# Patient Record
Sex: FEMALE | Race: WHITE | NOT HISPANIC OR LATINO | Employment: FULL TIME | URBAN - METROPOLITAN AREA
[De-identification: names, ages, dates, MRNs, and addresses within clinical notes are randomized per-mention and may not be internally consistent; named-entity substitution may affect disease eponyms.]

---

## 2022-05-23 ENCOUNTER — TELEPHONE (OUTPATIENT)
Dept: PERINATAL CARE | Facility: CLINIC | Age: 32
End: 2022-05-23

## 2022-05-23 NOTE — TELEPHONE ENCOUNTER
Lvm regarding cancelling her detailed ultrasound appt for today at [facility] due to sonographer calling out sick

## 2022-05-24 ENCOUNTER — ROUTINE PRENATAL (OUTPATIENT)
Dept: PERINATAL CARE | Facility: OTHER | Age: 32
End: 2022-05-24
Payer: COMMERCIAL

## 2022-05-24 VITALS
DIASTOLIC BLOOD PRESSURE: 69 MMHG | BODY MASS INDEX: 17.59 KG/M2 | WEIGHT: 89.6 LBS | HEART RATE: 89 BPM | HEIGHT: 60 IN | SYSTOLIC BLOOD PRESSURE: 106 MMHG

## 2022-05-24 DIAGNOSIS — O09.899 SUPERVISION OF OTHER HIGH RISK PREGNANCIES, UNSPECIFIED TRIMESTER: ICD-10-CM

## 2022-05-24 DIAGNOSIS — Z3A.19 19 WEEKS GESTATION OF PREGNANCY: ICD-10-CM

## 2022-05-24 DIAGNOSIS — O44.02 PLACENTA PREVIA WITHOUT HEMORRHAGE IN SECOND TRIMESTER: ICD-10-CM

## 2022-05-24 DIAGNOSIS — Z36.3 ENCOUNTER FOR ANTENATAL SCREENING FOR MALFORMATION USING ULTRASOUND: Primary | ICD-10-CM

## 2022-05-24 PROCEDURE — 76817 TRANSVAGINAL US OBSTETRIC: CPT | Performed by: OBSTETRICS & GYNECOLOGY

## 2022-05-24 PROCEDURE — 99242 OFF/OP CONSLTJ NEW/EST SF 20: CPT | Performed by: OBSTETRICS & GYNECOLOGY

## 2022-05-24 PROCEDURE — 76805 OB US >/= 14 WKS SNGL FETUS: CPT | Performed by: OBSTETRICS & GYNECOLOGY

## 2022-05-24 NOTE — LETTER
June 7, 2022     Oscar Bernabe MD  [address]  [address]  [address] [place] [id number]    Patient: Cara Allen   YOB: 1990   Date of Visit: 5/24/2022       Dear Dr Mccabe Landing: Thank you for referring Cara Allen to me for evaluation  Below are my notes for this consultation  If you have questions, please do not hesitate to call me  I look forward to following your patient along with you  Sincerely,        Hal Govea MD        CC: No Recipients  Hal Govea MD  6/7/2022  8:31 AM  Sign when Signing Visit  CONSULT NOTE    Oscar Bernabe MD  [address]  [address]  [facility],  [address] [place] [address]     Thank you for referring your Cara Allen for a Maternal-Fetal Medicine Consultation:  Below is my consultation  Thank you very much for requesting a consultation this very nice patient for the indication of a fetal anatomic evaluation  This is the patient's 2nd pregnancy  First pregnancy resulted in termination of pregnancy  She has no medical or surgical history  She currently takes prenatal vitamins and she has no known drug allergies  Substance use history is unremarkable  Family history is significant for her father with hypertension  On exam today Fernando Preston appears well, in no acute distress, and denies any complaints  The fetal anatomic survey is complete  There is no sonographic evidence of fetal abnormalities at this time  Good fetal movement and tone are seen  The amniotic fluid volume appears normal    A transvaginal ultrasound was performed to assess the cervix, which was not seen well transabdominally  A placenta previa is identified  The cervical length was 3 53 centimeters, which is normal for the current gestational age  There was no significant funneling or dynamic changes appreciated  The patient was informed of today's findings and all of her questions were answered    The limitations of ultrasound were reviewed  We discussed that her low BMI increases the risk slightly for fetal growth restriction  I encouraged weight gain of 35-45 lb total throughout the pregnancy  If she is having trouble gaining weight, recommend supplementation with high caloric food and formal dietary counseling  We discussed today's findings of a placenta previa confirmed on transvaginal imaging  We discussed that this is not an uncommon 2nd trimester finding and that it may resolve by the 3rd trimester  It is a risk factor for vaginal bleeding if it does not resolve by the 3rd trimester and  delivery may be necessary if the placental edge is not greater than 1 cm away from the internal os by term  We discussed follow-up this condition and I recommend she return at 28 weeks to re-evaluate the placenta location  I recommend the patient avoid intercourse so as not to increase the risk for vaginal bleeding until re-evaluation at 28 weeks  We discussed follow-up in detail, and I recommend a growth evaluation at around 28 weeks gestation  Please note, in addition to the time spent discussing the results of the ultrasound, I spent approximately 15 minutes of face-to-face time with the patient, greater than 50% of which was spent in counseling and the coordination of care for this patient  Thank you very much for allowing us to participate in the care of this very nice patient  Should you have any questions, please do not hesitate to contact our office  Charlie Isaacs MD  Attending Physician, Michelle

## 2022-05-24 NOTE — PROGRESS NOTES
Ultrasound Probe Disinfection    A transvaginal ultrasound was performed  Prior to use, disinfection was performed with High Level Disinfection Process (Trophon)  Probe serial number [id number]: [id number] was used        Chanell Madsen RDMS  05/24/22  2:41 PM

## 2022-06-07 PROBLEM — O44.02 PLACENTA PREVIA WITHOUT HEMORRHAGE IN SECOND TRIMESTER: Status: ACTIVE | Noted: 2022-06-07

## 2022-06-07 NOTE — PROGRESS NOTES
CONSULT NOTE    Sai Zavala MD  [facility]  [address]  [place],  [address] [facility]     Thank you for referring your Maximo Monet for a Maternal-Fetal Medicine Consultation:  Below is my consultation  Thank you very much for requesting a consultation this very nice patient for the indication of a fetal anatomic evaluation  This is the patient's 2nd pregnancy  First pregnancy resulted in termination of pregnancy  She has no medical or surgical history  She currently takes prenatal vitamins and she has no known drug allergies  Substance use history is unremarkable  Family history is significant for her father with hypertension  On exam today Eduardo Noonan appears well, in no acute distress, and denies any complaints  The fetal anatomic survey is complete  There is no sonographic evidence of fetal abnormalities at this time  Good fetal movement and tone are seen  The amniotic fluid volume appears normal    A transvaginal ultrasound was performed to assess the cervix, which was not seen well transabdominally  A placenta previa is identified  The cervical length was 3 53 centimeters, which is normal for the current gestational age  There was no significant funneling or dynamic changes appreciated  The patient was informed of today's findings and all of her questions were answered  The limitations of ultrasound were reviewed  We discussed that her low BMI increases the risk slightly for fetal growth restriction  I encouraged weight gain of 35-45 lb total throughout the pregnancy  If she is having trouble gaining weight, recommend supplementation with high caloric food and formal dietary counseling  We discussed today's findings of a placenta previa confirmed on transvaginal imaging  We discussed that this is not an uncommon 2nd trimester finding and that it may resolve by the 3rd trimester    It is a risk factor for vaginal bleeding if it does not resolve by the 3rd trimester and  delivery may be necessary if the placental edge is not greater than 1 cm away from the internal os by term  We discussed follow-up this condition and I recommend she return at 28 weeks to re-evaluate the placenta location  I recommend the patient avoid intercourse so as not to increase the risk for vaginal bleeding until re-evaluation at 28 weeks  We discussed follow-up in detail, and I recommend a growth evaluation at around 28 weeks gestation  Please note, in addition to the time spent discussing the results of the ultrasound, I spent approximately 15 minutes of face-to-face time with the patient, greater than 50% of which was spent in counseling and the coordination of care for this patient  Thank you very much for allowing us to participate in the care of this very nice patient  Should you have any questions, please do not hesitate to contact our office  Charlie Rahman MD  Attending Physician, Michelle

## 2022-07-18 ENCOUNTER — ULTRASOUND (OUTPATIENT)
Dept: PERINATAL CARE | Facility: OTHER | Age: 32
End: 2022-07-18
Payer: COMMERCIAL

## 2022-07-18 VITALS
HEART RATE: 85 BPM | WEIGHT: 95.2 LBS | HEIGHT: 60 IN | SYSTOLIC BLOOD PRESSURE: 128 MMHG | BODY MASS INDEX: 18.69 KG/M2 | DIASTOLIC BLOOD PRESSURE: 63 MMHG

## 2022-07-18 DIAGNOSIS — Z28.21 COVID-19 VACCINE SERIES DECLINED: ICD-10-CM

## 2022-07-18 DIAGNOSIS — Z3A.27 27 WEEKS GESTATION OF PREGNANCY: Primary | ICD-10-CM

## 2022-07-18 DIAGNOSIS — Z28.310 COVID-19 VACCINE SERIES DECLINED: ICD-10-CM

## 2022-07-18 PROCEDURE — 99213 OFFICE O/P EST LOW 20 MIN: CPT | Performed by: OBSTETRICS & GYNECOLOGY

## 2022-07-18 PROCEDURE — 76816 OB US FOLLOW-UP PER FETUS: CPT | Performed by: OBSTETRICS & GYNECOLOGY

## 2022-07-18 NOTE — LETTER
July 18, 2022     aDx Keenan MD  [address]  [address]  [address] [facility] [address]    Patient: Viky Palumbo   YOB: 1990   Date of Visit: 7/18/2022       Dear Dr Marcine Crigler: Thank you for referring Viky Palumbo to me for evaluation  Below are my notes for this consultation  If you have questions, please do not hesitate to call me  I look forward to following your patient along with you  Sincerely,        Go Hernandez MD        CC: No Recipients  Go Hernandez MD  7/18/2022  3:30 PM  Sign when Signing Visit  A fetal ultrasound was completed  See Ob procedures in Epic for an interpretation and recommendations  Do not hesitate to contact us in [facility] if you have questions  Kimberly Delgado MD, [facility]  Maternal Fetal Medicine

## 2022-07-18 NOTE — PROGRESS NOTES
A fetal ultrasound was completed  See Ob procedures in Epic for an interpretation and recommendations  Do not hesitate to contact us in [employer] if you have questions  Fady Lovell MD, [address] [facility]  Maternal Fetal Medicine

## 2022-09-02 ENCOUNTER — ULTRASOUND (OUTPATIENT)
Dept: PERINATAL CARE | Facility: OTHER | Age: 32
End: 2022-09-02
Payer: COMMERCIAL

## 2022-09-02 VITALS
DIASTOLIC BLOOD PRESSURE: 58 MMHG | HEART RATE: 100 BPM | HEIGHT: 60 IN | SYSTOLIC BLOOD PRESSURE: 98 MMHG | BODY MASS INDEX: 20.5 KG/M2 | WEIGHT: 104.4 LBS

## 2022-09-02 DIAGNOSIS — Z36.2 ENCOUNTER FOR OTHER ANTENATAL SCREENING FOLLOW-UP: Primary | ICD-10-CM

## 2022-09-02 DIAGNOSIS — Z3A.34 34 WEEKS GESTATION OF PREGNANCY: ICD-10-CM

## 2022-09-02 PROBLEM — O44.02 PLACENTA PREVIA WITHOUT HEMORRHAGE IN SECOND TRIMESTER: Status: RESOLVED | Noted: 2022-06-07 | Resolved: 2022-09-02

## 2022-09-02 PROCEDURE — 76816 OB US FOLLOW-UP PER FETUS: CPT | Performed by: OBSTETRICS & GYNECOLOGY

## 2022-09-02 PROCEDURE — 99212 OFFICE O/P EST SF 10 MIN: CPT | Performed by: OBSTETRICS & GYNECOLOGY

## 2022-09-02 RX ORDER — DOCUSATE SODIUM 250 MG
250 CAPSULE ORAL EVERY OTHER DAY
COMMUNITY

## 2022-09-02 NOTE — PROGRESS NOTES
The patient was seen today for an ultrasound  Please see ultrasound report (located under Ob Procedures) for additional details  Thank you very much for allowing us to participate in the care of this very nice patient  Should you have any questions, please do not hesitate to contact me  Charlie Foote MD [id number]  Attending Physician, Michelle

## 2025-08-05 ENCOUNTER — NURSE TRIAGE (OUTPATIENT)
Age: 35
End: 2025-08-05

## 2025-08-06 ENCOUNTER — OFFICE VISIT (OUTPATIENT)
Dept: FAMILY MEDICINE CLINIC | Facility: HOSPITAL | Age: 35
End: 2025-08-06
Payer: COMMERCIAL

## 2025-08-06 VITALS
SYSTOLIC BLOOD PRESSURE: 100 MMHG | RESPIRATION RATE: 16 BRPM | HEART RATE: 79 BPM | TEMPERATURE: 98.4 F | BODY MASS INDEX: 16.88 KG/M2 | DIASTOLIC BLOOD PRESSURE: 60 MMHG | WEIGHT: 86 LBS | HEIGHT: 60 IN | OXYGEN SATURATION: 96 %

## 2025-08-06 DIAGNOSIS — Z13.0 SCREENING FOR DEFICIENCY ANEMIA: ICD-10-CM

## 2025-08-06 DIAGNOSIS — K12.0 CANKER SORE: ICD-10-CM

## 2025-08-06 DIAGNOSIS — E60 ZINC DEFICIENCY: ICD-10-CM

## 2025-08-06 DIAGNOSIS — Z11.59 NEED FOR HEPATITIS C SCREENING TEST: ICD-10-CM

## 2025-08-06 DIAGNOSIS — Z11.4 SCREENING FOR HIV (HUMAN IMMUNODEFICIENCY VIRUS): ICD-10-CM

## 2025-08-06 DIAGNOSIS — E53.8 B12 DEFICIENCY: ICD-10-CM

## 2025-08-06 DIAGNOSIS — Z13.29 SCREENING FOR THYROID DISORDER: ICD-10-CM

## 2025-08-06 DIAGNOSIS — E53.8 FOLATE DEFICIENCY: ICD-10-CM

## 2025-08-06 DIAGNOSIS — Z13.220 SCREENING FOR LIPID DISORDERS: ICD-10-CM

## 2025-08-06 DIAGNOSIS — Z76.89 ENCOUNTER TO ESTABLISH CARE: Primary | ICD-10-CM

## 2025-08-06 PROBLEM — Z28.310 COVID-19 VACCINE SERIES DECLINED: Status: RESOLVED | Noted: 2022-07-18 | Resolved: 2025-08-06

## 2025-08-06 PROBLEM — Z3A.27 27 WEEKS GESTATION OF PREGNANCY: Status: RESOLVED | Noted: 2022-05-24 | Resolved: 2025-08-06

## 2025-08-06 PROBLEM — Z28.21 COVID-19 VACCINE SERIES DECLINED: Status: RESOLVED | Noted: 2022-07-18 | Resolved: 2025-08-06

## 2025-08-06 PROCEDURE — 99203 OFFICE O/P NEW LOW 30 MIN: CPT | Performed by: NURSE PRACTITIONER

## 2025-08-06 RX ORDER — SUCRALFATE ORAL 1 G/10ML
1 SUSPENSION ORAL 4 TIMES DAILY
Qty: 280 ML | Refills: 0 | Status: SHIPPED | OUTPATIENT
Start: 2025-08-06 | End: 2025-08-13